# Patient Record
Sex: MALE | URBAN - METROPOLITAN AREA
[De-identification: names, ages, dates, MRNs, and addresses within clinical notes are randomized per-mention and may not be internally consistent; named-entity substitution may affect disease eponyms.]

---

## 2022-08-09 ENCOUNTER — APPOINTMENT (RX ONLY)
Dept: URBAN - METROPOLITAN AREA CLINIC 236 | Facility: CLINIC | Age: 32
Setting detail: DERMATOLOGY
End: 2022-08-09

## 2022-08-09 DIAGNOSIS — L28.0 LICHEN SIMPLEX CHRONICUS: ICD-10-CM | Status: INADEQUATELY CONTROLLED

## 2022-08-09 PROCEDURE — 99213 OFFICE O/P EST LOW 20 MIN: CPT

## 2022-08-09 PROCEDURE — ? PRESCRIPTION MEDICATION MANAGEMENT

## 2022-08-09 PROCEDURE — ? COUNSELING

## 2022-08-09 PROCEDURE — ? PRESCRIPTION

## 2022-08-09 RX ORDER — FLUTICASONE PROPIONATE 0.5 MG/G
CREAM TOPICAL QD
Qty: 30 | Refills: 2 | Status: ERX | COMMUNITY
Start: 2022-08-09

## 2022-08-09 RX ORDER — MUPIROCIN 20 MG/G
OINTMENT TOPICAL
Qty: 22 | Refills: 3 | Status: ERX | COMMUNITY
Start: 2022-08-09

## 2022-08-09 RX ADMIN — MUPIROCIN: 20 OINTMENT TOPICAL at 00:00

## 2022-08-09 RX ADMIN — FLUTICASONE PROPIONATE: 0.5 CREAM TOPICAL at 00:00

## 2022-08-09 ASSESSMENT — LOCATION ZONE DERM: LOCATION ZONE: PENIS

## 2022-08-09 ASSESSMENT — LOCATION DETAILED DESCRIPTION DERM: LOCATION DETAILED: RIGHT DORSAL SHAFT OF PENIS

## 2022-08-09 ASSESSMENT — LOCATION SIMPLE DESCRIPTION DERM: LOCATION SIMPLE: PENIS

## 2022-08-09 NOTE — PROCEDURE: PRESCRIPTION MEDICATION MANAGEMENT
Initiate Treatment: Fluticasone cream 0.05% bid for 2 weeks\\nMupirocin bid
Detail Level: Zone
Render In Strict Bullet Format?: No

## 2022-08-09 NOTE — HPI: RASH
Is The Patient Presenting As Previously Scheduled?: Yes
How Severe Is Your Rash?: mild
Is This A New Presentation, Or A Follow-Up?: Rash
Additional History: Patient tried opzelaura with no improvement.

## 2022-12-07 ENCOUNTER — APPOINTMENT (RX ONLY)
Dept: URBAN - METROPOLITAN AREA CLINIC 236 | Facility: CLINIC | Age: 32
Setting detail: DERMATOLOGY
End: 2022-12-07

## 2022-12-07 DIAGNOSIS — L28.0 LICHEN SIMPLEX CHRONICUS: ICD-10-CM | Status: INADEQUATELY CONTROLLED

## 2022-12-07 DIAGNOSIS — L20.89 OTHER ATOPIC DERMATITIS: ICD-10-CM | Status: INADEQUATELY CONTROLLED

## 2022-12-07 PROCEDURE — 99214 OFFICE O/P EST MOD 30 MIN: CPT

## 2022-12-07 PROCEDURE — ? PRESCRIPTION MEDICATION MANAGEMENT

## 2022-12-07 PROCEDURE — ? COUNSELING

## 2022-12-07 PROCEDURE — ? PRESCRIPTION

## 2022-12-07 RX ORDER — SALICYLIC ACID 3 G/100G
LOTION TOPICAL
Qty: 30 | Refills: 2 | Status: ERX | COMMUNITY
Start: 2022-12-07

## 2022-12-07 RX ORDER — CAMPHOR AND MENTHOL 5; 5 MG/ML; MG/ML
LOTION TOPICAL
Qty: 222 | Refills: 0 | Status: ERX | COMMUNITY
Start: 2022-12-07

## 2022-12-07 RX ORDER — PREDNISONE 5 MG/1
TABLET ORAL
Qty: 10 | Refills: 0 | Status: ERX | COMMUNITY
Start: 2022-12-07

## 2022-12-07 RX ORDER — MOMETASONE FUROATE 1 MG/G
CREAM TOPICAL QD
Qty: 45 | Refills: 0 | Status: ERX | COMMUNITY
Start: 2022-12-07

## 2022-12-07 RX ADMIN — PREDNISONE: 5 TABLET ORAL at 00:00

## 2022-12-07 RX ADMIN — MOMETASONE FUROATE: 1 CREAM TOPICAL at 00:00

## 2022-12-07 RX ADMIN — CAMPHOR AND MENTHOL: 5; 5 LOTION TOPICAL at 00:00

## 2022-12-07 RX ADMIN — SALICYLIC ACID: 3 LOTION TOPICAL at 00:00

## 2022-12-07 ASSESSMENT — LOCATION DETAILED DESCRIPTION DERM
LOCATION DETAILED: RIGHT ELBOW
LOCATION DETAILED: LEFT PROXIMAL DORSAL FOREARM
LOCATION DETAILED: RIGHT DORSAL SHAFT OF PENIS

## 2022-12-07 ASSESSMENT — LOCATION ZONE DERM
LOCATION ZONE: PENIS
LOCATION ZONE: ARM

## 2022-12-07 ASSESSMENT — LOCATION SIMPLE DESCRIPTION DERM
LOCATION SIMPLE: RIGHT ELBOW
LOCATION SIMPLE: PENIS
LOCATION SIMPLE: LEFT FOREARM

## 2022-12-07 NOTE — PROCEDURE: PRESCRIPTION MEDICATION MANAGEMENT
Render In Strict Bullet Format?: No
Detail Level: Zone
Initiate Treatment: Prednisone 5mg one PO QD for 10 days \\nAllegra 180mg one PO QD

## 2023-01-10 ENCOUNTER — RX ONLY (OUTPATIENT)
Age: 33
Setting detail: RX ONLY
End: 2023-01-10

## 2023-01-10 RX ORDER — RUXOLITINIB 15 MG/G
CREAM TOPICAL
Qty: 60 | Refills: 1 | Status: ERX | COMMUNITY
Start: 2023-01-10

## 2023-02-21 ENCOUNTER — APPOINTMENT (RX ONLY)
Dept: URBAN - METROPOLITAN AREA CLINIC 236 | Facility: CLINIC | Age: 33
Setting detail: DERMATOLOGY
End: 2023-02-21

## 2023-02-21 DIAGNOSIS — L29.89 OTHER PRURITUS: ICD-10-CM | Status: INADEQUATELY CONTROLLED

## 2023-02-21 DIAGNOSIS — L30.9 DERMATITIS, UNSPECIFIED: ICD-10-CM | Status: INADEQUATELY CONTROLLED

## 2023-02-21 DIAGNOSIS — L29.8 OTHER PRURITUS: ICD-10-CM | Status: INADEQUATELY CONTROLLED

## 2023-02-21 PROCEDURE — ? PRESCRIPTION MEDICATION MANAGEMENT

## 2023-02-21 PROCEDURE — 96372 THER/PROPH/DIAG INJ SC/IM: CPT

## 2023-02-21 PROCEDURE — ? COUNSELING

## 2023-02-21 PROCEDURE — 99213 OFFICE O/P EST LOW 20 MIN: CPT | Mod: 25

## 2023-02-21 PROCEDURE — ? PRESCRIPTION

## 2023-02-21 PROCEDURE — ? INTRAMUSCULAR KENALOG

## 2023-02-21 RX ORDER — RUXOLITINIB 15 MG/G
CREAM TOPICAL
Qty: 60 | Refills: 3 | Status: ERX

## 2023-02-21 RX ORDER — TRIAMCINOLONE ACETONIDE 1 MG/G
CREAM TOPICAL BID
Qty: 453.6 | Refills: 2 | Status: ERX | COMMUNITY
Start: 2023-02-21

## 2023-02-21 RX ADMIN — TRIAMCINOLONE ACETONIDE: 1 CREAM TOPICAL at 00:00

## 2023-02-21 ASSESSMENT — LOCATION DETAILED DESCRIPTION DERM
LOCATION DETAILED: RIGHT PROXIMAL PRETIBIAL REGION
LOCATION DETAILED: RIGHT INFERIOR LATERAL LOWER BACK
LOCATION DETAILED: RIGHT MID-UPPER BACK
LOCATION DETAILED: RIGHT ANTECUBITAL SKIN
LOCATION DETAILED: LEFT ANTECUBITAL SKIN
LOCATION DETAILED: LEFT SUPERIOR MEDIAL MIDBACK
LOCATION DETAILED: RIGHT DISTAL DORSAL FOREARM
LOCATION DETAILED: LEFT PROXIMAL PRETIBIAL REGION

## 2023-02-21 ASSESSMENT — LOCATION ZONE DERM
LOCATION ZONE: LEG
LOCATION ZONE: ARM
LOCATION ZONE: TRUNK
LOCATION ZONE: ARM

## 2023-02-21 ASSESSMENT — LOCATION SIMPLE DESCRIPTION DERM
LOCATION SIMPLE: RIGHT ELBOW
LOCATION SIMPLE: RIGHT PRETIBIAL REGION
LOCATION SIMPLE: RIGHT UPPER BACK
LOCATION SIMPLE: RIGHT FOREARM
LOCATION SIMPLE: LEFT PRETIBIAL REGION
LOCATION SIMPLE: LEFT LOWER BACK
LOCATION SIMPLE: RIGHT LOWER BACK
LOCATION SIMPLE: LEFT ELBOW

## 2023-02-21 NOTE — PROCEDURE: PRESCRIPTION MEDICATION MANAGEMENT
Render In Strict Bullet Format?: No
Detail Level: Zone
Initiate Treatment: Apply Triamcinalone to affected areas BID for up to two weeks/month. Alternate with Opzelura BID for two weeks

## 2023-02-21 NOTE — PROCEDURE: INTRAMUSCULAR KENALOG
Kenalog Preparation: kenalog
Consent: The risks of atrophy were reviewed with the patient.
Concentration (Mg/Ml): 40.0
Add Option For Additional Mediation: No
Concentration (Mg/Ml) Of Additional Medication: 2.5
Total Volume (Ccs): 1
Detail Level: Simple
Administered By (Optional): BISHOP

## 2023-04-11 ENCOUNTER — APPOINTMENT (RX ONLY)
Dept: URBAN - METROPOLITAN AREA CLINIC 236 | Facility: CLINIC | Age: 33
Setting detail: DERMATOLOGY
End: 2023-04-11

## 2023-04-11 DIAGNOSIS — L21.8 OTHER SEBORRHEIC DERMATITIS: ICD-10-CM | Status: INADEQUATELY CONTROLLED

## 2023-04-11 DIAGNOSIS — L259 CONTACT DERMATITIS AND OTHER ECZEMA, UNSPECIFIED CAUSE: ICD-10-CM | Status: INADEQUATELY CONTROLLED

## 2023-04-11 DIAGNOSIS — L20.89 OTHER ATOPIC DERMATITIS: ICD-10-CM

## 2023-04-11 PROBLEM — L23.9 ALLERGIC CONTACT DERMATITIS, UNSPECIFIED CAUSE: Status: ACTIVE | Noted: 2023-04-11

## 2023-04-11 PROCEDURE — ? ADDITIONAL NOTES

## 2023-04-11 PROCEDURE — ? PRESCRIPTION MEDICATION MANAGEMENT

## 2023-04-11 PROCEDURE — ? PRESCRIPTION

## 2023-04-11 PROCEDURE — ? MDM - TREATMENT GOALS

## 2023-04-11 PROCEDURE — ? COUNSELING

## 2023-04-11 PROCEDURE — 99214 OFFICE O/P EST MOD 30 MIN: CPT

## 2023-04-11 RX ORDER — RUXOLITINIB 15 MG/G
CREAM TOPICAL QD
Qty: 60 | Refills: 2 | Status: ERX

## 2023-04-11 ASSESSMENT — LOCATION ZONE DERM
LOCATION ZONE: LIP
LOCATION ZONE: TRUNK

## 2023-04-11 ASSESSMENT — LOCATION SIMPLE DESCRIPTION DERM
LOCATION SIMPLE: RIGHT UPPER BACK
LOCATION SIMPLE: ABDOMEN
LOCATION SIMPLE: LEFT LIP

## 2023-04-11 ASSESSMENT — LOCATION DETAILED DESCRIPTION DERM
LOCATION DETAILED: LEFT LOWER CUTANEOUS LIP
LOCATION DETAILED: PERIUMBILICAL SKIN
LOCATION DETAILED: RIGHT MEDIAL UPPER BACK

## 2023-04-11 NOTE — PROCEDURE: ADDITIONAL NOTES
Render Risk Assessment In Note?: no
Detail Level: Zone
Additional Notes: Patient has failed steroids and protopic ointment \\n\\nNot a good candidate for Eucrisa due to burning after application
Additional Notes: Patient to avoid products with nickel

## 2023-04-11 NOTE — PROCEDURE: PRESCRIPTION MEDICATION MANAGEMENT
Continue Regimen: HTC 2.5% apply qod \\nKetoconazole cream bid
Render In Strict Bullet Format?: No
Detail Level: Zone
Initiate Treatment: Opzelura 1.5 % topical cream QD\\nQuantity: 60.0 g  Days Supply: 30\\nSig: Apply thin layer to affected areas qd for 2 months

## 2023-05-17 ENCOUNTER — APPOINTMENT (RX ONLY)
Dept: URBAN - METROPOLITAN AREA CLINIC 236 | Facility: CLINIC | Age: 33
Setting detail: DERMATOLOGY
End: 2023-05-17

## 2023-05-17 DIAGNOSIS — K13.0 DISEASES OF LIPS: ICD-10-CM | Status: INADEQUATELY CONTROLLED

## 2023-05-17 DIAGNOSIS — L20.89 OTHER ATOPIC DERMATITIS: ICD-10-CM | Status: INADEQUATELY CONTROLLED

## 2023-05-17 DIAGNOSIS — L21.8 OTHER SEBORRHEIC DERMATITIS: ICD-10-CM | Status: STABLE

## 2023-05-17 PROCEDURE — ? ADDITIONAL NOTES

## 2023-05-17 PROCEDURE — ? COUNSELING

## 2023-05-17 PROCEDURE — 99214 OFFICE O/P EST MOD 30 MIN: CPT

## 2023-05-17 PROCEDURE — ? MDM - TREATMENT GOALS

## 2023-05-17 PROCEDURE — ? PRESCRIPTION MEDICATION MANAGEMENT

## 2023-05-17 ASSESSMENT — LOCATION SIMPLE DESCRIPTION DERM
LOCATION SIMPLE: LEFT LIP
LOCATION SIMPLE: RIGHT UPPER BACK
LOCATION SIMPLE: RIGHT LIP

## 2023-05-17 ASSESSMENT — LOCATION ZONE DERM
LOCATION ZONE: LIP
LOCATION ZONE: TRUNK

## 2023-05-17 ASSESSMENT — LOCATION DETAILED DESCRIPTION DERM
LOCATION DETAILED: RIGHT MEDIAL UPPER BACK
LOCATION DETAILED: RIGHT INFERIOR VERMILION LIP
LOCATION DETAILED: LEFT LOWER CUTANEOUS LIP

## 2023-05-17 NOTE — PROCEDURE: ADDITIONAL NOTES
Render Risk Assessment In Note?: no
Additional Notes: Pt was not able to get opzelura cream
Detail Level: Zone

## 2023-05-17 NOTE — PROCEDURE: PRESCRIPTION MEDICATION MANAGEMENT
Detail Level: Zone
Render In Strict Bullet Format?: No
Initiate Treatment: Hydrocortisone 2.5% cream\\nApply QD for two weeks to affected areas on lips and \\nTwice a week as needed for the eyelids
Plan: Patient states he was using Triamcinolone BID for only two days/week. Patient educated on correct usage of Triamcinolone
Continue Regimen: Triamcinalone cream\\nApply to affected areas BID for up to two weeks/month
Continue Regimen: HTC 2.5% apply qod \\nKetoconazole cream bid

## 2023-06-09 ENCOUNTER — APPOINTMENT (RX ONLY)
Dept: URBAN - METROPOLITAN AREA CLINIC 236 | Facility: CLINIC | Age: 33
Setting detail: DERMATOLOGY
End: 2023-06-09

## 2023-06-09 DIAGNOSIS — L21.8 OTHER SEBORRHEIC DERMATITIS: ICD-10-CM | Status: INADEQUATELY CONTROLLED

## 2023-06-09 DIAGNOSIS — K13.0 DISEASES OF LIPS: ICD-10-CM | Status: INADEQUATELY CONTROLLED

## 2023-06-09 DIAGNOSIS — L20.89 OTHER ATOPIC DERMATITIS: ICD-10-CM | Status: STABLE

## 2023-06-09 PROBLEM — L30.9 DERMATITIS, UNSPECIFIED: Status: ACTIVE | Noted: 2023-06-09

## 2023-06-09 PROCEDURE — ? COUNSELING

## 2023-06-09 PROCEDURE — 99214 OFFICE O/P EST MOD 30 MIN: CPT

## 2023-06-09 PROCEDURE — ? TREATMENT REGIMEN

## 2023-06-09 PROCEDURE — ? PRESCRIPTION MEDICATION MANAGEMENT

## 2023-06-09 PROCEDURE — ? ADDITIONAL NOTES

## 2023-06-09 PROCEDURE — ? PRESCRIPTION

## 2023-06-09 RX ORDER — DESONIDE 0.5 MG/G
OINTMENT TOPICAL BID
Qty: 15 | Refills: 0 | Status: ERX | COMMUNITY
Start: 2023-06-09

## 2023-06-09 RX ORDER — KETOCONAZOLE 20 MG/G
CREAM TOPICAL
Qty: 15 | Refills: 1 | Status: ERX | COMMUNITY
Start: 2023-06-09

## 2023-06-09 RX ORDER — KETOCONAZOLE 20 MG/ML
SHAMPOO, SUSPENSION TOPICAL QD
Qty: 120 | Refills: 6 | Status: ERX | COMMUNITY
Start: 2023-06-09

## 2023-06-09 RX ADMIN — DESONIDE: 0.5 OINTMENT TOPICAL at 00:00

## 2023-06-09 RX ADMIN — KETOCONAZOLE: 20 CREAM TOPICAL at 00:00

## 2023-06-09 RX ADMIN — KETOCONAZOLE: 20 SHAMPOO, SUSPENSION TOPICAL at 00:00

## 2023-06-09 ASSESSMENT — LOCATION DETAILED DESCRIPTION DERM
LOCATION DETAILED: RIGHT SUPERIOR VERMILION LIP
LOCATION DETAILED: RIGHT SUPERIOR VERMILION LIP
LOCATION DETAILED: HAIR
LOCATION DETAILED: RIGHT VENTRAL WRIST

## 2023-06-09 ASSESSMENT — LOCATION SIMPLE DESCRIPTION DERM
LOCATION SIMPLE: HAIR
LOCATION SIMPLE: RIGHT LIP
LOCATION SIMPLE: RIGHT WRIST
LOCATION SIMPLE: RIGHT LIP

## 2023-06-09 ASSESSMENT — LOCATION ZONE DERM
LOCATION ZONE: ARM
LOCATION ZONE: SCALP
LOCATION ZONE: LIP
LOCATION ZONE: LIP

## 2023-06-09 ASSESSMENT — BSA ECZEMA: % BODY COVERED IN ECZEMA: 26

## 2023-06-09 NOTE — PROCEDURE: PRESCRIPTION MEDICATION MANAGEMENT
Render In Strict Bullet Format?: No
Detail Level: Zone
Continue Regimen: Triamcinolone prn to flares
Initiate Treatment: Apply Ketoconazole shampoo to scalp 3x a week

## 2023-07-26 ENCOUNTER — APPOINTMENT (RX ONLY)
Dept: URBAN - METROPOLITAN AREA CLINIC 236 | Facility: CLINIC | Age: 33
Setting detail: DERMATOLOGY
End: 2023-07-26

## 2023-07-26 DIAGNOSIS — K13.0 DISEASES OF LIPS: ICD-10-CM | Status: INADEQUATELY CONTROLLED

## 2023-07-26 PROBLEM — L30.9 DERMATITIS, UNSPECIFIED: Status: ACTIVE | Noted: 2023-07-26

## 2023-07-26 PROCEDURE — 99214 OFFICE O/P EST MOD 30 MIN: CPT

## 2023-07-26 PROCEDURE — ? PRESCRIPTION

## 2023-07-26 PROCEDURE — ? MEDICATION COUNSELING

## 2023-07-26 PROCEDURE — ? ADDITIONAL NOTES

## 2023-07-26 PROCEDURE — ? COUNSELING

## 2023-07-26 RX ORDER — PREDNISONE 20 MG/1
TABLET ORAL
Qty: 5 | Refills: 0 | Status: ERX | COMMUNITY
Start: 2023-07-26

## 2023-07-26 RX ADMIN — PREDNISONE: 20 TABLET ORAL at 00:00

## 2023-07-26 ASSESSMENT — LOCATION ZONE DERM
LOCATION ZONE: LIP
LOCATION ZONE: LIP
LOCATION ZONE: ARM
LOCATION ZONE: ARM

## 2023-07-26 ASSESSMENT — LOCATION DETAILED DESCRIPTION DERM
LOCATION DETAILED: LEFT VENTRAL DISTAL FOREARM
LOCATION DETAILED: LEFT SUPERIOR VERMILION LIP
LOCATION DETAILED: RIGHT VENTRAL DISTAL FOREARM
LOCATION DETAILED: RIGHT SUPERIOR VERMILION LIP
LOCATION DETAILED: RIGHT SUPERIOR VERMILION LIP
LOCATION DETAILED: LEFT VENTRAL DISTAL FOREARM
LOCATION DETAILED: RIGHT VENTRAL DISTAL FOREARM
LOCATION DETAILED: LEFT SUPERIOR VERMILION LIP

## 2023-07-26 ASSESSMENT — LOCATION SIMPLE DESCRIPTION DERM
LOCATION SIMPLE: RIGHT LIP
LOCATION SIMPLE: LEFT FOREARM
LOCATION SIMPLE: RIGHT FOREARM
LOCATION SIMPLE: LEFT FOREARM
LOCATION SIMPLE: LEFT LIP
LOCATION SIMPLE: RIGHT FOREARM
LOCATION SIMPLE: RIGHT LIP
LOCATION SIMPLE: LEFT LIP

## 2023-07-26 NOTE — PROCEDURE: ADDITIONAL NOTES
Additional Notes: Patient states no cream helps with itch except hydrocortisone. Patient was given instruction to follow up with Dr. Carrillo at [facility] and agrees with plan Additional Notes: Patient states no cream helps with itch except hydrocortisone. Patient was given instruction to follow up with Dr. Carrillo at [facility] and agrees with plan